# Patient Record
Sex: FEMALE | Race: OTHER | HISPANIC OR LATINO | URBAN - METROPOLITAN AREA
[De-identification: names, ages, dates, MRNs, and addresses within clinical notes are randomized per-mention and may not be internally consistent; named-entity substitution may affect disease eponyms.]

---

## 2024-07-17 ENCOUNTER — APPOINTMENT (RX ONLY)
Dept: URBAN - METROPOLITAN AREA CLINIC 146 | Facility: CLINIC | Age: 59
Setting detail: DERMATOLOGY
End: 2024-07-17

## 2024-07-17 DIAGNOSIS — Z42.1 ENCOUNTER FOR BREAST RECONSTRUCTION FOLLOWING MASTECTOMY: ICD-10-CM

## 2024-07-17 DIAGNOSIS — C50 MALIGNANT NEOPLASM OF BREAST: ICD-10-CM

## 2024-07-17 PROBLEM — C50.412 MALIGNANT NEOPLASM OF UPPER-OUTER QUADRANT OF LEFT FEMALE BREAST: Status: ACTIVE | Noted: 2024-07-17

## 2024-07-17 PROCEDURE — ? COUNSELING - BREAST CANCER

## 2024-07-17 PROCEDURE — 99214 OFFICE O/P EST MOD 30 MIN: CPT

## 2024-07-17 PROCEDURE — ? COUNSELING -  BREAST RECONSTRUCTION

## 2024-07-17 ASSESSMENT — LOCATION SIMPLE DESCRIPTION DERM: LOCATION SIMPLE: LEFT BREAST

## 2024-07-17 ASSESSMENT — LOCATION ZONE DERM: LOCATION ZONE: TRUNK

## 2024-07-17 ASSESSMENT — LOCATION DETAILED DESCRIPTION DERM: LOCATION DETAILED: LEFT LATERAL BREAST 12-1:00 REGION

## 2024-07-17 NOTE — HPI: BREAST RECONSTRUCTION CONSULTATION
What Is Your Pre-Diagnosis Bra Size (Numeric)?: 34
Is This A New Presentation, Or A Follow-Up?: Breast Reconstruction Consultation
Who Is Your Surgical Oncologist (If Known)?: Dr. Huddleston
Additional History: Patient is unsure if she wants to have a lumpectomy or mastectomy performed

## 2024-07-17 NOTE — PROCEDURE: COUNSELING -  BREAST RECONSTRUCTION
Detail Level: Simple
Allergan Gel Implant Size(S) - Left: 350cc-400cc
Count Minor/Major Decisions Toward Mdm (Not Cosmetic)?: Yes
Allergan Gel Implant Size(S) - Right: possibly 200cc or 100cc less than left breast

## 2024-08-13 ENCOUNTER — RX ONLY (OUTPATIENT)
Age: 59
Setting detail: RX ONLY
End: 2024-08-13

## 2024-08-13 RX ORDER — ONDANSETRON 8 MG/1
TABLET, ORALLY DISINTEGRATING ORAL
Qty: 8 | Refills: 0 | Status: ERX | COMMUNITY
Start: 2024-08-13

## 2024-08-13 RX ORDER — OXYCODONE HYDROCHLORIDE AND ACETAMINOPHEN 5; 325 MG/1; MG/1
TABLET ORAL
Qty: 18 | Refills: 0 | Status: ERX | COMMUNITY
Start: 2024-08-13

## 2024-08-13 RX ORDER — CEPHALEXIN 500 MG/1
CAPSULE ORAL
Qty: 42 | Refills: 0 | Status: ERX | COMMUNITY
Start: 2024-08-13

## 2024-08-20 ENCOUNTER — RX ONLY (OUTPATIENT)
Age: 59
Setting detail: RX ONLY
End: 2024-08-20

## 2024-08-20 RX ORDER — CIPROFLOXACIN HYDROCHLORIDE 500 MG/1
TABLET, FILM COATED ORAL
Qty: 14 | Refills: 0 | Status: ERX | COMMUNITY
Start: 2024-08-20

## 2024-09-10 ENCOUNTER — APPOINTMENT (RX ONLY)
Dept: URBAN - METROPOLITAN AREA CLINIC 146 | Facility: CLINIC | Age: 59
Setting detail: DERMATOLOGY
End: 2024-09-10

## 2024-09-10 DIAGNOSIS — Z48.89 ENCOUNTER FOR OTHER SPECIFIED SURGICAL AFTERCARE: ICD-10-CM

## 2024-09-10 PROCEDURE — 99024 POSTOP FOLLOW-UP VISIT: CPT

## 2024-09-10 PROCEDURE — ? ADDITIONAL NOTES

## 2024-09-10 PROCEDURE — ? POST-OP CHECK

## 2024-09-10 ASSESSMENT — LOCATION SIMPLE DESCRIPTION DERM
LOCATION SIMPLE: RIGHT BREAST
LOCATION SIMPLE: LEFT BREAST

## 2024-09-10 ASSESSMENT — LOCATION ZONE DERM: LOCATION ZONE: TRUNK

## 2024-09-10 ASSESSMENT — LOCATION DETAILED DESCRIPTION DERM
LOCATION DETAILED: RIGHT LATERAL BREAST 7-8:00 REGION
LOCATION DETAILED: LEFT LATERAL BREAST 5-6:00 REGION

## 2024-09-10 NOTE — PROCEDURE: POST-OP CHECK
Detail Level: Detailed
Add Postop Global No-Charge Code (74295)?: yes
Wound Evaluated By: Dr. Berrios

## 2024-09-10 NOTE — PROCEDURE: ADDITIONAL NOTES
Additional Notes: Pt hasn’t complied with post-operative protocols. As such, her drains were removed on 09/10/2024 despite undergoing surgery on 08/21/2024. Pt complained of pain. Reassured pt that pain should subside as a result of drain removal. Pt to F/U in 10 days.
Render Risk Assessment In Note?: no

## 2024-09-20 ENCOUNTER — APPOINTMENT (RX ONLY)
Dept: URBAN - METROPOLITAN AREA CLINIC 146 | Facility: CLINIC | Age: 59
Setting detail: DERMATOLOGY
End: 2024-09-20

## 2024-09-20 DIAGNOSIS — Z48.89 ENCOUNTER FOR OTHER SPECIFIED SURGICAL AFTERCARE: ICD-10-CM

## 2024-09-20 PROCEDURE — ? ADDITIONAL NOTES

## 2024-09-20 PROCEDURE — 99024 POSTOP FOLLOW-UP VISIT: CPT

## 2024-09-20 PROCEDURE — ? POST-OP CHECK

## 2024-09-20 ASSESSMENT — LOCATION SIMPLE DESCRIPTION DERM
LOCATION SIMPLE: LEFT BREAST
LOCATION SIMPLE: RIGHT BREAST

## 2024-09-20 ASSESSMENT — LOCATION DETAILED DESCRIPTION DERM
LOCATION DETAILED: LEFT MEDIAL BREAST 6-7:00 REGION
LOCATION DETAILED: RIGHT MEDIAL BREAST 5-6:00 REGION

## 2024-09-20 ASSESSMENT — LOCATION ZONE DERM: LOCATION ZONE: TRUNK

## 2024-09-20 NOTE — PROCEDURE: ADDITIONAL NOTES
Additional Notes: Patient denies any fevers or chills. She reports pain in her left armpit. Advised that this could be due to the lymph node removal by Dr. Huddleston and to follow up with him. \\nShe can take Aleve for pain \\nNo redness, irritation, or signs of infection.\\nFollow up in two months
Render Risk Assessment In Note?: no

## 2024-10-22 ENCOUNTER — APPOINTMENT (RX ONLY)
Dept: URBAN - METROPOLITAN AREA CLINIC 146 | Facility: CLINIC | Age: 59
Setting detail: DERMATOLOGY
End: 2024-10-22

## 2024-10-22 DIAGNOSIS — Z48.89 ENCOUNTER FOR OTHER SPECIFIED SURGICAL AFTERCARE: ICD-10-CM

## 2024-10-22 DIAGNOSIS — N65.0 DEFORMITY OF RECONSTRUCTED BREAST: ICD-10-CM

## 2024-10-22 DIAGNOSIS — N65.1 DISPROPORTION OF RECONSTRUCTED BREAST: ICD-10-CM

## 2024-10-22 PROCEDURE — ? POST-OP CHECK

## 2024-10-22 PROCEDURE — 99024 POSTOP FOLLOW-UP VISIT: CPT

## 2024-10-22 PROCEDURE — ? COUNSELING -  BREAST RECONSTRUCTION REVISION

## 2024-10-22 PROCEDURE — ? MASTOPEXY

## 2024-10-22 PROCEDURE — ? ADDITIONAL NOTES

## 2024-10-22 ASSESSMENT — LOCATION SIMPLE DESCRIPTION DERM
LOCATION SIMPLE: RIGHT BREAST
LOCATION SIMPLE: LEFT BREAST

## 2024-10-22 ASSESSMENT — LOCATION ZONE DERM: LOCATION ZONE: TRUNK

## 2024-10-22 NOTE — PROCEDURE: MASTOPEXY
Medial Pedicle Mastopexy Text: A medial pedicle was deepithelialized according to preoperative markings and backcut to allow rotation. The nipple-areolar complex was suspended based on this superior pedicle and inset was performed. The nipple-areolar complex appeared well vascularized upon inset.
Bipedicle Mastopexy Text: Superior and inferior pedicles were deepithelialized according to preoperative markings. The nipple-areolar complex was suspended based on this bipedicled flap and inset was performed. The nipple-areolar complex appeared well vascularized upon inset.
Left Nipple Marking: cookie cutter was used to circumscribe the left nipple-areolar complex. 
Right Nipple Marking: cookie cutter was used to circumscribe the right nipple-areolar complex. 
Left Breast Resection (G): 0
Drains Placed?: No
Which Side Was Performed First?: Right
Epidermal Closure: horizontal mattress
[place] Mastopexy Text: A supra-areolar crescent of skin was deepithelialized according to preoperative markings. The nipple-areolar complex was suspended and inset was performed. The nipple-areolar complex appeared well vascularized upon inset.
Superior-Medial Pedicle Mastopexy Text: A superior-medial pedicle was deepithelialized according to preoperative markings and backcut to allow rotation. The nipple-areolar complex was suspended based on this superior pedicle and inset was performed. The nipple-areolar complex appeared well vascularized upon inset.
Free Nipple Graft Text: The nipple was removed and placed into moist gauze while the procedure was performed. Once breast shape was satisfactory, the graft was thinned and defatted and inset. A bolster was applied at the end of the procedure.
Periareolar Mastopexy Text: A central breast mound pedicle was deepithelialized according to preoperative markings. The dermis was incised to permit purse-string closure of the skin envelope to the new areolar diameter.  The parenchyma was mobilized to allow suspension of the nipple-areolar complex. Inset included a purse-string suture in the deep dermis narrowing the outer circular diameter to approximate it to the new areolar diameter. The nipple-areolar complex appeared well vascularized upon inset.
Detail Level: Zone
Positioning: The patient was brought to the operating room and placed in the standard supine position in the usual manner. After placement of monitors, anesthesia was achieved as above uneventfully.
Inferior Pedicle Mastopexy Text: An inferior pedicle was deepithelialized according to preoperative markings. The lateral and medial skin flaps were raised to permit mobilization and suspension of the pedicle. The nipple-areolar complex was suspended based on this inferior pedicle and inset was performed. The nipple-areolar complex appeared well vascularized upon inset.
Hemostasis: electrocautery
Jose Alejandro Mastopexy Text: A superior pedicle was deepithelialized according to preoperative markings as described by Jose Alejandro. The nipple-areolar complex was suspended based on this superior pedicle and inset was performed. The nipple-areolar complex appeared well vascularized upon inset.
Superior Pedicle Mastopexy Text: A superior pedicle was deepithelialized according to preoperative markings. The nipple-areolar complex was suspended based on this superior pedicle and inset was performed. The nipple-areolar complex appeared well vascularized upon inset.
Estimated Blood Loss (Cc): minimal
Consent: The risks, benefits, expectations and alternatives of mastopexy were discussed with the patient including, but not limited to, infection, bleeding, injury to nerves, blood vessels or other structures, pneumothorax, seroma, deep venous thrombosis, delayed healing, visible scarring, contour irregularities, asymmetry, nipple loss, loss of nipple sensation, cosmetic dissatisfaction and unplanned return to the operating room. All questions were answered, full understanding was verbalized and with appropriate informed consent thus obtained, the consent form was signed. A time-out was performed confirming the identities of the patient and surgeon, the operative site(s) and the operative plan per the informed consent.

## 2024-10-22 NOTE — PROCEDURE: ADDITIONAL NOTES
Additional Notes: Pt complains of pain. Dr. Berrios suggested it could be related to sutures that haven’t dissolved. The Implant can be removed but then the chest would be flat and a prosthetic breast would be needed. For cosmetic improvement skin tightening could be done by removing some of the skin underneath the breast. To improve the pain a surgery could be done to remove the sutures. The scar revision could be done at the same time. The right side is totally different from the left side because the left side a mastectomy was performed first. Wait at least 1 more month for re excision unless RT is needed then schedule the Excision before starting RT to prevent healing problems. Pt would like to wait until after the holidays if possible. If pain persists after next procedure consider removing the implant. She can take Aleve or Advil for pain. No longterm narcotics can be prescribed by our office, she would have to see pain management if needed. She states she would like a little more fullness on the right upper breast. For a lift on the right side an incision would be made around the nipple and a 2” incision from the nipple down. Plan: Mastopexy will be done on the right side and a reconstruction scar revision on the left.  Ask Dr. Jose Nunez if RT is necessary at tomorrow’s appointment. At previous consult with Dr. Pollock, she recommended RT. Moisturizer recommended for dry skin. Call office after appointment to schedule surgery.
Render Risk Assessment In Note?: no

## 2024-11-13 ENCOUNTER — APPOINTMENT (RX ONLY)
Dept: URBAN - METROPOLITAN AREA CLINIC 146 | Facility: CLINIC | Age: 59
Setting detail: DERMATOLOGY
End: 2024-11-13

## 2024-11-13 DIAGNOSIS — N65.1 DISPROPORTION OF RECONSTRUCTED BREAST: ICD-10-CM

## 2024-11-13 DIAGNOSIS — N65.0 DEFORMITY OF RECONSTRUCTED BREAST: ICD-10-CM

## 2024-11-13 DIAGNOSIS — Z48.89 ENCOUNTER FOR OTHER SPECIFIED SURGICAL AFTERCARE: ICD-10-CM

## 2024-11-13 PROCEDURE — ? MASTOPEXY

## 2024-11-13 PROCEDURE — ? COUNSELING -  BREAST RECONSTRUCTION REVISION

## 2024-11-13 PROCEDURE — ? ADDITIONAL NOTES

## 2024-11-13 PROCEDURE — ? POST-OP CHECK

## 2024-11-13 PROCEDURE — 99212 OFFICE O/P EST SF 10 MIN: CPT

## 2024-11-13 ASSESSMENT — LOCATION SIMPLE DESCRIPTION DERM
LOCATION SIMPLE: LEFT BREAST
LOCATION SIMPLE: RIGHT BREAST

## 2024-11-13 ASSESSMENT — LOCATION ZONE DERM: LOCATION ZONE: TRUNK

## 2024-11-13 NOTE — PROCEDURE: MASTOPEXY
Medial Pedicle Mastopexy Text: A medial pedicle was deepithelialized according to preoperative markings and backcut to allow rotation. The nipple-areolar complex was suspended based on this superior pedicle and inset was performed. The nipple-areolar complex appeared well vascularized upon inset.
Bipedicle Mastopexy Text: Superior and inferior pedicles were deepithelialized according to preoperative markings. The nipple-areolar complex was suspended based on this bipedicled flap and inset was performed. The nipple-areolar complex appeared well vascularized upon inset.
Left Nipple Marking: cookie cutter was used to circumscribe the left nipple-areolar complex. 
Right Nipple Marking: cookie cutter was used to circumscribe the right nipple-areolar complex. 
Left Breast Resection (G): 0
Drains Placed?: No
Which Side Was Performed First?: Right
Epidermal Closure: horizontal mattress
[place] Mastopexy Text: A supra-areolar crescent of skin was deepithelialized according to preoperative markings. The nipple-areolar complex was suspended and inset was performed. The nipple-areolar complex appeared well vascularized upon inset.
Superior-Medial Pedicle Mastopexy Text: A superior-medial pedicle was deepithelialized according to preoperative markings and backcut to allow rotation. The nipple-areolar complex was suspended based on this superior pedicle and inset was performed. The nipple-areolar complex appeared well vascularized upon inset.
Free Nipple Graft Text: The nipple was removed and placed into moist gauze while the procedure was performed. Once breast shape was satisfactory, the graft was thinned and defatted and inset. A bolster was applied at the end of the procedure.
Periareolar Mastopexy Text: A central breast mound pedicle was deepithelialized according to preoperative markings. The dermis was incised to permit purse-string closure of the skin envelope to the new areolar diameter.  The parenchyma was mobilized to allow suspension of the nipple-areolar complex. Inset included a purse-string suture in the deep dermis narrowing the outer circular diameter to approximate it to the new areolar diameter. The nipple-areolar complex appeared well vascularized upon inset.
Detail Level: Zone
Positioning: The patient was brought to the operating room and placed in the standard supine position in the usual manner. After placement of monitors, anesthesia was achieved as above uneventfully.
Inferior Pedicle Mastopexy Text: An inferior pedicle was deepithelialized according to preoperative markings. The lateral and medial skin flaps were raised to permit mobilization and suspension of the pedicle. The nipple-areolar complex was suspended based on this inferior pedicle and inset was performed. The nipple-areolar complex appeared well vascularized upon inset.
Hemostasis: electrocautery
Jose Alejandro Mastopexy Text: A superior pedicle was deepithelialized according to preoperative markings as described by Jose Alejandro. The nipple-areolar complex was suspended based on this superior pedicle and inset was performed. The nipple-areolar complex appeared well vascularized upon inset.
Superior Pedicle Mastopexy Text: A superior pedicle was deepithelialized according to preoperative markings. The nipple-areolar complex was suspended based on this superior pedicle and inset was performed. The nipple-areolar complex appeared well vascularized upon inset.
Estimated Blood Loss (Cc): minimal
Consent: The risks, benefits, expectations and alternatives of mastopexy were discussed with the patient including, but not limited to, infection, bleeding, injury to nerves, blood vessels or other structures, pneumothorax, seroma, deep venous thrombosis, delayed healing, visible scarring, contour irregularities, asymmetry, nipple loss, loss of nipple sensation, cosmetic dissatisfaction and unplanned return to the operating room. All questions were answered, full understanding was verbalized and with appropriate informed consent thus obtained, the consent form was signed. A time-out was performed confirming the identities of the patient and surgeon, the operative site(s) and the operative plan per the informed consent.

## 2024-11-13 NOTE — PROCEDURE: ADDITIONAL NOTES
Additional Notes: She will hold off on RT until after the reconstruction. She would like to have a PET scan, orders would be from oncology. On the left the extra skin will be removed, the scar will be released, and sutures will be removed to improve the pain. It will look and feel better. There is plenty of fullness at the top of the left breast. The right breast will be lifted. Take anti inflammatory medication like Aleve (Naproxen) take 1-2 or Advil 600-800 mg. For long term pain management see a pain doctor. Pain pills will be given postoperatively only. The procedure will take about 1-1/2 hours at the [facility] [place]. Pt would like to wait until after the holidays if possible. Plan: Mastopexy will be done on the right side and a reconstruction scar revision on the left.
Render Risk Assessment In Note?: no

## 2024-12-27 ENCOUNTER — APPOINTMENT (OUTPATIENT)
Dept: URBAN - METROPOLITAN AREA CLINIC 146 | Facility: CLINIC | Age: 59
Setting detail: DERMATOLOGY
End: 2024-12-27

## 2024-12-27 DIAGNOSIS — N65.0 DEFORMITY OF RECONSTRUCTED BREAST: ICD-10-CM

## 2024-12-27 DIAGNOSIS — N65.1 DISPROPORTION OF RECONSTRUCTED BREAST: ICD-10-CM

## 2024-12-27 DIAGNOSIS — Z48.89 ENCOUNTER FOR OTHER SPECIFIED SURGICAL AFTERCARE: ICD-10-CM

## 2024-12-27 PROCEDURE — 99212 OFFICE O/P EST SF 10 MIN: CPT

## 2024-12-27 PROCEDURE — ? COUNSELING -  BREAST RECONSTRUCTION REVISION

## 2024-12-27 PROCEDURE — ? POST-OP CHECK

## 2024-12-27 PROCEDURE — ? ADDITIONAL NOTES

## 2024-12-27 PROCEDURE — ? MASTOPEXY

## 2024-12-27 ASSESSMENT — LOCATION DETAILED DESCRIPTION DERM
LOCATION DETAILED: RIGHT MEDIAL BREAST 5-6:00 REGION
LOCATION DETAILED: LEFT MEDIAL BREAST 6-7:00 REGION

## 2024-12-27 ASSESSMENT — LOCATION SIMPLE DESCRIPTION DERM
LOCATION SIMPLE: LEFT BREAST
LOCATION SIMPLE: RIGHT BREAST

## 2024-12-27 ASSESSMENT — LOCATION ZONE DERM: LOCATION ZONE: TRUNK

## 2024-12-27 NOTE — PROCEDURE: ADDITIONAL NOTES
Additional Notes: She will hold off on RT until after the reconstruction. On the left the extra skin will be removed, the scar will be released, and sutures will be removed to improve the pain. It will look and feel better. There is plenty of fullness at the top of the left breast. The right breast will be lifted. Take anti inflammatory medication like Aleve (Naproxen) take 2-3 equally a prescription dose. There is scar pain around the implant. The old scar will be released and old sutures removed. The implant will be higher after the scar tissue is removed. This For long term pain management see a pain doctor. Pain pills will be given postoperatively only. The procedure will take about 1-1/2 hours at the [facility] surgery [place]. Pt would like to wait until after the holidays if possible. Plan: Mastopexy will be done on the right side and a reconstruction scar revision on the left. Scheduled for surgery on February 5, 2025.
Render Risk Assessment In Note?: no

## 2024-12-27 NOTE — PROCEDURE: MASTOPEXY
Medial Pedicle Mastopexy Text: A medial pedicle was deepithelialized according to preoperative markings and backcut to allow rotation. The nipple-areolar complex was suspended based on this superior pedicle and inset was performed. The nipple-areolar complex appeared well vascularized upon inset.
Bipedicle Mastopexy Text: Superior and inferior pedicles were deepithelialized according to preoperative markings. The nipple-areolar complex was suspended based on this bipedicled flap and inset was performed. The nipple-areolar complex appeared well vascularized upon inset.
Left Nipple Marking: cookie cutter was used to circumscribe the left nipple-areolar complex. 
Right Nipple Marking: cookie cutter was used to circumscribe the right nipple-areolar complex. 
Left Breast Resection (G): 0
Drains Placed?: No
Which Side Was Performed First?: Right
Epidermal Closure: horizontal mattress
[place] Mastopexy Text: A supra-areolar crescent of skin was deepithelialized according to preoperative markings. The nipple-areolar complex was suspended and inset was performed. The nipple-areolar complex appeared well vascularized upon inset.
Superior-Medial Pedicle Mastopexy Text: A superior-medial pedicle was deepithelialized according to preoperative markings and backcut to allow rotation. The nipple-areolar complex was suspended based on this superior pedicle and inset was performed. The nipple-areolar complex appeared well vascularized upon inset.
Free Nipple Graft Text: The nipple was removed and placed into moist gauze while the procedure was performed. Once breast shape was satisfactory, the graft was thinned and defatted and inset. A bolster was applied at the end of the procedure.
Periareolar Mastopexy Text: A central breast mound pedicle was deepithelialized according to preoperative markings. The dermis was incised to permit purse-string closure of the skin envelope to the new areolar diameter.  The parenchyma was mobilized to allow suspension of the nipple-areolar complex. Inset included a purse-string suture in the deep dermis narrowing the outer circular diameter to approximate it to the new areolar diameter. The nipple-areolar complex appeared well vascularized upon inset.
Detail Level: Zone
Positioning: The patient was brought to the operating room and placed in the standard supine position in the usual manner. After placement of monitors, anesthesia was achieved as above uneventfully.
Inferior Pedicle Mastopexy Text: An inferior pedicle was deepithelialized according to preoperative markings. The lateral and medial skin flaps were raised to permit mobilization and suspension of the pedicle. The nipple-areolar complex was suspended based on this inferior pedicle and inset was performed. The nipple-areolar complex appeared well vascularized upon inset.
Hemostasis: electrocautery
Jose Alejandro Mastopexy Text: A superior pedicle was deepithelialized according to preoperative markings as described by Jose Alejandro. The nipple-areolar complex was suspended based on this superior pedicle and inset was performed. The nipple-areolar complex appeared well vascularized upon inset.
Superior Pedicle Mastopexy Text: A superior pedicle was deepithelialized according to preoperative markings. The nipple-areolar complex was suspended based on this superior pedicle and inset was performed. The nipple-areolar complex appeared well vascularized upon inset.
Estimated Blood Loss (Cc): minimal
Consent: The risks, benefits, expectations and alternatives of mastopexy were discussed with the patient including, but not limited to, infection, bleeding, injury to nerves, blood vessels or other structures, pneumothorax, seroma, deep venous thrombosis, delayed healing, visible scarring, contour irregularities, asymmetry, nipple loss, loss of nipple sensation, cosmetic dissatisfaction and unplanned return to the operating room. All questions were answered, full understanding was verbalized and with appropriate informed consent thus obtained, the consent form was signed. A time-out was performed confirming the identities of the patient and surgeon, the operative site(s) and the operative plan per the informed consent.

## 2025-01-28 ENCOUNTER — RX ONLY (RX ONLY)
Age: 60
End: 2025-01-28

## 2025-01-28 RX ORDER — ONDANSETRON 8 MG/1
TABLET, ORALLY DISINTEGRATING ORAL
Qty: 8 | Refills: 0 | Status: CANCELLED

## 2025-01-28 RX ORDER — OXYCODONE HYDROCHLORIDE AND ACETAMINOPHEN 5; 325 MG/1; MG/1
TABLET ORAL
Qty: 18 | Refills: 0 | Status: ERX

## 2025-01-28 RX ORDER — CEPHALEXIN 500 MG/1
CAPSULE ORAL
Qty: 42 | Refills: 0 | Status: ERX

## 2025-02-06 ENCOUNTER — APPOINTMENT (OUTPATIENT)
Dept: URBAN - METROPOLITAN AREA CLINIC 146 | Facility: CLINIC | Age: 60
Setting detail: DERMATOLOGY
End: 2025-02-06

## 2025-02-06 DIAGNOSIS — Z48.89 ENCOUNTER FOR OTHER SPECIFIED SURGICAL AFTERCARE: ICD-10-CM

## 2025-02-06 PROCEDURE — 99024 POSTOP FOLLOW-UP VISIT: CPT

## 2025-02-06 PROCEDURE — ? POST-OP CHECK

## 2025-02-06 PROCEDURE — ? ADDITIONAL NOTES

## 2025-02-06 ASSESSMENT — LOCATION ZONE DERM: LOCATION ZONE: TRUNK

## 2025-02-06 ASSESSMENT — LOCATION SIMPLE DESCRIPTION DERM
LOCATION SIMPLE: LEFT BREAST
LOCATION SIMPLE: RIGHT BREAST

## 2025-02-06 NOTE — PROCEDURE: ADDITIONAL NOTES
Render Risk Assessment In Note?: no
Additional Notes: She can switch do a different bra after she showers tomorrow. \\nBreasts are healing well. Abd pads placed in bra\\nFollow up in one week

## 2025-02-12 ENCOUNTER — APPOINTMENT (OUTPATIENT)
Dept: URBAN - METROPOLITAN AREA CLINIC 146 | Facility: CLINIC | Age: 60
Setting detail: DERMATOLOGY
End: 2025-02-12

## 2025-02-12 DIAGNOSIS — Z48.89 ENCOUNTER FOR OTHER SPECIFIED SURGICAL AFTERCARE: ICD-10-CM

## 2025-02-12 PROCEDURE — 99024 POSTOP FOLLOW-UP VISIT: CPT

## 2025-02-12 PROCEDURE — ? POST-OP CHECK

## 2025-02-12 PROCEDURE — ? ADDITIONAL NOTES

## 2025-02-12 ASSESSMENT — LOCATION ZONE DERM: LOCATION ZONE: TRUNK

## 2025-02-12 ASSESSMENT — LOCATION SIMPLE DESCRIPTION DERM
LOCATION SIMPLE: RIGHT BREAST
LOCATION SIMPLE: LEFT BREAST

## 2025-02-12 NOTE — PROCEDURE: ADDITIONAL NOTES
Additional Notes: Breasts are healing well. Yellow bruising noted on bilateral breasts. She can take Advil or Aleve for pain. Partial suture removal performed today. Remaining sutures to be removed next week.
Render Risk Assessment In Note?: no

## 2025-02-12 NOTE — HPI: POST-OP CHECK
History of breast reconstruction. Surgery 2/5/25 for breast lift and reconstruction  revision Immediate family member

## 2025-02-18 ENCOUNTER — APPOINTMENT (OUTPATIENT)
Dept: URBAN - METROPOLITAN AREA CLINIC 146 | Facility: CLINIC | Age: 60
Setting detail: DERMATOLOGY
End: 2025-02-18

## 2025-02-18 DIAGNOSIS — Z48.89 ENCOUNTER FOR OTHER SPECIFIED SURGICAL AFTERCARE: ICD-10-CM

## 2025-02-18 PROCEDURE — ? ADDITIONAL NOTES

## 2025-02-18 PROCEDURE — 99024 POSTOP FOLLOW-UP VISIT: CPT

## 2025-02-18 PROCEDURE — ? POST-OP CHECK

## 2025-02-18 ASSESSMENT — LOCATION ZONE DERM: LOCATION ZONE: TRUNK

## 2025-02-18 ASSESSMENT — LOCATION SIMPLE DESCRIPTION DERM
LOCATION SIMPLE: LEFT BREAST
LOCATION SIMPLE: RIGHT BREAST

## 2025-02-18 NOTE — PROCEDURE: ADDITIONAL NOTES
Additional Notes: Remaining sutures removed today. Breasts are healing well. Advised that breasts will soften with time. Patient complains of pain in her breasts. She can take advil or aleve for pain. Advised that if she is having chronic pain then she can see a pain management doctor. She asked about Percocet which is only prescribed for post op. We are two weeks post surgery. Advised that narcotics are not meant to be taken for long periods of time. Patient can do any remaining treatment for her breast cancer. \\nFollow up in one month
Render Risk Assessment In Note?: no

## 2025-03-19 ENCOUNTER — APPOINTMENT (OUTPATIENT)
Dept: URBAN - METROPOLITAN AREA CLINIC 146 | Facility: CLINIC | Age: 60
Setting detail: DERMATOLOGY
End: 2025-03-19

## 2025-03-19 DIAGNOSIS — Z48.89 ENCOUNTER FOR OTHER SPECIFIED SURGICAL AFTERCARE: ICD-10-CM

## 2025-03-19 PROCEDURE — ? POST-OP CHECK

## 2025-03-19 PROCEDURE — ? ADDITIONAL NOTES

## 2025-03-19 PROCEDURE — 99024 POSTOP FOLLOW-UP VISIT: CPT

## 2025-03-19 ASSESSMENT — LOCATION SIMPLE DESCRIPTION DERM
LOCATION SIMPLE: RIGHT BREAST
LOCATION SIMPLE: LEFT BREAST
LOCATION SIMPLE: RIGHT BREAST
LOCATION SIMPLE: LEFT BREAST

## 2025-03-19 ASSESSMENT — LOCATION DETAILED DESCRIPTION DERM
LOCATION DETAILED: RIGHT MEDIAL BREAST 5-6:00 REGION
LOCATION DETAILED: RIGHT MEDIAL BREAST 5-6:00 REGION
LOCATION DETAILED: LEFT MEDIAL BREAST 6-7:00 REGION
LOCATION DETAILED: LEFT MEDIAL BREAST 6-7:00 REGION

## 2025-03-19 ASSESSMENT — LOCATION ZONE DERM
LOCATION ZONE: TRUNK
LOCATION ZONE: TRUNK

## 2025-03-19 NOTE — PROCEDURE: ADDITIONAL NOTES
Additional Notes: Breasts are healing very well. Patient states her nipple has been burning, I explained that sometimes this is normal and the sensitivity improves in time. The breasts have fullness and a great shape. Patient is having significant soreness and tenderness with the left breast. The left breast is very soft. Scar tissue can be sore and anti inflammatory medicine can help - she can try taking advil or Alleve daily. Breasts look very even and symmetrical. She is having some nipple sensitivity, she can apply a lidocaine anesthesic gel to the areas (can be purchased at her pharmacy OTC). She can moisturize her skin around her chest to help with the roughness that she is feeling. Explained to her that the pain and soreness will improve over time, since it’s likely due to the scar tissue around the implant on the left breast. Recommended that she try to wear a bra for some support to see if it helps alleviate some of her pain. \\n\\nFollow up in 2 months.
Render Risk Assessment In Note?: no

## 2025-05-21 ENCOUNTER — APPOINTMENT (OUTPATIENT)
Dept: URBAN - METROPOLITAN AREA CLINIC 146 | Facility: CLINIC | Age: 60
Setting detail: DERMATOLOGY
End: 2025-05-21

## 2025-05-21 DIAGNOSIS — Z85.3 PERSONAL HISTORY OF MALIGNANT NEOPLASM OF BREAST: ICD-10-CM

## 2025-05-21 PROCEDURE — ? PRESCRIPTION

## 2025-05-21 PROCEDURE — ? ADDITIONAL NOTES

## 2025-05-21 PROCEDURE — ? COUNSELING -  BREAST RECONSTRUCTION

## 2025-05-21 PROCEDURE — ?

## 2025-05-21 RX ORDER — CELECOXIB 100 MG/1
CAPSULE ORAL QD
Qty: 30 | Refills: 1 | Status: ERX | COMMUNITY
Start: 2025-05-21

## 2025-05-21 RX ADMIN — CELECOXIB: 100 CAPSULE ORAL at 00:00

## 2025-05-21 NOTE — PROCEDURE: ADDITIONAL NOTES
Additional Notes: The right breast looks good. She complains about pain on the nipple. The scar around the areola is a little lumpy. Consider injecting with Kenalog [place] at the next visit. The left breast is soft. No capsular contracture. These are 2 different breasts. The left is radiated so it will not look the same as the right. More work was done on the left lateral breast, due to scar tissue. This would be the cause for pain. It will improve with time. Both breasts look even. Celebrex 100 mg take 1 po QD sent to the pharmacy. Photos taken today.\\n\\nF/u in 2 months to check the scar and check her pain relief.
Render Risk Assessment In Note?: no

## 2025-07-23 ENCOUNTER — APPOINTMENT (OUTPATIENT)
Dept: URBAN - METROPOLITAN AREA CLINIC 146 | Facility: CLINIC | Age: 60
Setting detail: DERMATOLOGY
End: 2025-07-23

## 2025-07-23 DIAGNOSIS — Z85.3 PERSONAL HISTORY OF MALIGNANT NEOPLASM OF BREAST: ICD-10-CM

## 2025-07-23 PROCEDURE — ?

## 2025-07-23 PROCEDURE — ? ADDITIONAL NOTES

## 2025-07-23 PROCEDURE — ? COUNSELING -  BREAST RECONSTRUCTION

## 2025-07-23 NOTE — PROCEDURE: ADDITIONAL NOTES
Render Risk Assessment In Note?: no
Additional Notes: The patient's recent oncology blood work and CT scans returned clear, showing no evidence of cancer. The right breast appears soft, flat, and well-shaped, though there is some soreness around the nipple. The scar surrounding the nipple is pink but is expected to fade over time, and the discomfort can be managed with Aleve as needed. The left breast, which was previously treated with radiation, remains soft but continues to cause pain and tenderness. At this time, there are no surgical options to address the pain, but a pain management doctor may be considered. The next follow-up appointment is scheduled for February 2026.